# Patient Record
(demographics unavailable — no encounter records)

---

## 2025-07-30 NOTE — HISTORY OF PRESENT ILLNESS
[Postpartum Follow Up] : postpartum follow up [Last Pap Date: ___] : Last Pap Date: [unfilled] [Delivery Date: ___] : on [unfilled] [Primary C/S] : delivered by  section [Male] : Delivery History: baby boy [Wt. ___] : weighing [unfilled] [Discharge HCT: ___] : hematocrit level was [unfilled] [Discharge HGB: ___] : hemoglobin level was [unfilled] [None] : No associated symptoms are reported [Clean/Dry/Intact] : clean, dry and intact [Erythema] : not erythematous [Swelling] : not swollen [Doing Well] : is doing well [No Sign of Infection] : is showing no signs of infection [No [place]] : to avoid sexual intercourse [Limited ADLs] : to participate in activities of daily living with limitations [No Work] : not to work [No Housework] : not to do housework [No Sports] : not to participate in sports [FreeTextEntry8] : incision check [FreeTextEntry9] : reports uncomplicated prenatal course [de-identified] : received prenatal care at [facility], was closer to [facility] and was admitted for delivery, dilation to 10 cm,  pushing led to fetal distress, C/S performed, PEC in labor.  Readmitted on 7/26-7/27 for MgSO4 for PEC, BP today stable, home BPs reported as stable, home care nurse checking daily [de-identified] : breast (pumping)  and formula, Baby not latching as was admitted to NICU post delivery [de-identified] : incision CDI, continue home BP checks, s/s PEC reviewed [de-identified] : 4 wk revisit,  [Complications:___] : no complications [Resumed Menses] : has not resumed her menses [Resumed [place]] : has not resumed intercourse

## 2025-07-30 NOTE — HISTORY OF PRESENT ILLNESS
[Postpartum Follow Up] : postpartum follow up [Last Pap Date: ___] : Last Pap Date: [unfilled] [Delivery Date: ___] : on [unfilled] [Primary C/S] : delivered by  section [Male] : Delivery History: baby boy [Wt. ___] : weighing [unfilled] [Discharge HCT: ___] : hematocrit level was [unfilled] [Discharge HGB: ___] : hemoglobin level was [unfilled] [None] : No associated symptoms are reported [Clean/Dry/Intact] : clean, dry and intact [Erythema] : not erythematous [Swelling] : not swollen [Doing Well] : is doing well [No Sign of Infection] : is showing no signs of infection [No [place]] : to avoid sexual intercourse [Limited ADLs] : to participate in activities of daily living with limitations [No Work] : not to work [No Housework] : not to do housework [No Sports] : not to participate in sports [FreeTextEntry8] : incision check [FreeTextEntry9] : reports uncomplicated prenatal course [de-identified] : received prenatal care at [facility], was closer to [facility] and was admitted for delivery, dilation to 10 cm,  pushing led to fetal distress, C/S performed, PEC in labor.  Readmitted on 7/26-7/27 for MgSO4 for PEC, BP today stable, home BPs reported as stable, home care nurse checking daily [de-identified] : breast (pumping)  and formula, Baby not latching as was admitted to NICU post delivery [de-identified] : incision CDI, continue home BP checks, s/s PEC reviewed [de-identified] : 4 wk revisit,  [Complications:___] : no complications [Resumed Menses] : has not resumed her menses [Resumed [place]] : has not resumed intercourse